# Patient Record
Sex: FEMALE | Race: BLACK OR AFRICAN AMERICAN | Employment: UNEMPLOYED | URBAN - METROPOLITAN AREA
[De-identification: names, ages, dates, MRNs, and addresses within clinical notes are randomized per-mention and may not be internally consistent; named-entity substitution may affect disease eponyms.]

---

## 2017-02-16 PROBLEM — E43 SEVERE PROTEIN-CALORIE MALNUTRITION (HCC): Status: ACTIVE | Noted: 2017-02-16

## 2017-12-08 PROBLEM — E46 PROTEIN CALORIE MALNUTRITION (HCC): Status: ACTIVE | Noted: 2017-01-01

## 2018-01-01 ENCOUNTER — OFFICE VISIT (OUTPATIENT)
Dept: FAMILY MEDICINE CLINIC | Age: 58
End: 2018-01-01
Payer: COMMERCIAL

## 2018-01-01 ENCOUNTER — HOSPITAL ENCOUNTER (OUTPATIENT)
Age: 58
Discharge: HOME | End: 2018-03-22
Payer: COMMERCIAL

## 2018-01-01 ENCOUNTER — APPOINTMENT (OUTPATIENT)
Dept: GENERAL RADIOLOGY | Age: 58
DRG: 196 | End: 2018-01-01
Payer: COMMERCIAL

## 2018-01-01 ENCOUNTER — TELEPHONE (OUTPATIENT)
Dept: FAMILY MEDICINE CLINIC | Age: 58
End: 2018-01-01

## 2018-01-01 ENCOUNTER — HOSPITAL ENCOUNTER (INPATIENT)
Age: 58
LOS: 1 days | DRG: 196 | End: 2018-07-11
Attending: EMERGENCY MEDICINE | Admitting: FAMILY MEDICINE
Payer: COMMERCIAL

## 2018-01-01 ENCOUNTER — OFFICE VISIT (OUTPATIENT)
Dept: NEUROLOGY | Age: 58
End: 2018-01-01
Payer: COMMERCIAL

## 2018-01-01 VITALS
HEIGHT: 67 IN | SYSTOLIC BLOOD PRESSURE: 99 MMHG | OXYGEN SATURATION: 95 % | RESPIRATION RATE: 16 BRPM | TEMPERATURE: 97.3 F | BODY MASS INDEX: 21.82 KG/M2 | DIASTOLIC BLOOD PRESSURE: 58 MMHG | WEIGHT: 139 LBS | HEART RATE: 67 BPM

## 2018-01-01 VITALS
HEIGHT: 67 IN | OXYGEN SATURATION: 47 % | TEMPERATURE: 94.8 F | WEIGHT: 131 LBS | SYSTOLIC BLOOD PRESSURE: 54 MMHG | BODY MASS INDEX: 20.56 KG/M2

## 2018-01-01 VITALS
TEMPERATURE: 97.6 F | HEART RATE: 70 BPM | RESPIRATION RATE: 18 BRPM | WEIGHT: 136 LBS | SYSTOLIC BLOOD PRESSURE: 98 MMHG | OXYGEN SATURATION: 97 % | BODY MASS INDEX: 21.35 KG/M2 | DIASTOLIC BLOOD PRESSURE: 66 MMHG | HEIGHT: 67 IN

## 2018-01-01 DIAGNOSIS — I46.9 CARDIAC ARREST (HCC): Primary | ICD-10-CM

## 2018-01-01 DIAGNOSIS — G20 PARKINSON'S DISEASE (HCC): Primary | ICD-10-CM

## 2018-01-01 DIAGNOSIS — Z91.81: ICD-10-CM

## 2018-01-01 DIAGNOSIS — E03.9 HYPOTHYROIDISM, UNSPECIFIED TYPE: ICD-10-CM

## 2018-01-01 DIAGNOSIS — Z76.0 MEDICATION REFILL: ICD-10-CM

## 2018-01-01 DIAGNOSIS — E03.9 HYPOTHYROIDISM, UNSPECIFIED TYPE: Primary | ICD-10-CM

## 2018-01-01 DIAGNOSIS — G20 PARKINSON'S DISEASE (HCC): ICD-10-CM

## 2018-01-01 LAB
AADO2: 501.6 MMHG
AADO2: 526.2 MMHG
AADO2: 581.6 MMHG
ALBUMIN SERPL-MCNC: 3.1 G/DL (ref 3.5–5.2)
ALBUMIN SERPL-MCNC: 3.1 G/DL (ref 3.5–5.2)
ALP BLD-CCNC: 47 U/L (ref 35–104)
ALP BLD-CCNC: 76 U/L (ref 35–104)
ALT SERPL-CCNC: 131 U/L (ref 0–32)
ALT SERPL-CCNC: 42 U/L (ref 0–32)
ANION GAP SERPL CALCULATED.3IONS-SCNC: 31 MMOL/L (ref 7–16)
ANION GAP SERPL CALCULATED.3IONS-SCNC: 32 MMOL/L (ref 7–16)
ANISOCYTOSIS: ABNORMAL
APTT: 80.4 SEC (ref 24.5–35.1)
AST SERPL-CCNC: 260 U/L (ref 0–31)
AST SERPL-CCNC: 624 U/L (ref 0–31)
B.E.: -20.7 MMOL/L (ref -3–3)
B.E.: -23.5 MMOL/L (ref -3–3)
BASOPHILS ABSOLUTE: 0.04 E9/L (ref 0–0.2)
BASOPHILS ABSOLUTE: 0.05 E9/L (ref 0–0.2)
BASOPHILS RELATIVE PERCENT: 0.3 % (ref 0–2)
BASOPHILS RELATIVE PERCENT: 0.9 % (ref 0–2)
BILIRUB SERPL-MCNC: 0.3 MG/DL (ref 0–1.2)
BILIRUB SERPL-MCNC: 0.5 MG/DL (ref 0–1.2)
BUN BLDV-MCNC: 16 MG/DL (ref 6–20)
BUN BLDV-MCNC: 20 MG/DL (ref 6–20)
BURR CELLS: ABNORMAL
CALCIUM SERPL-MCNC: 7.4 MG/DL (ref 8.6–10.2)
CALCIUM SERPL-MCNC: 9.9 MG/DL (ref 8.6–10.2)
CHLORIDE BLD-SCNC: 101 MMOL/L (ref 98–107)
CHLORIDE BLD-SCNC: 102 MMOL/L (ref 98–107)
CO2: 13 MMOL/L (ref 22–29)
CO2: 13 MMOL/L (ref 22–29)
COHB: 0.2 % (ref 0–1.5)
COHB: 0.3 % (ref 0–1.5)
COHB: 0.3 % (ref 0–1.5)
CREAT SERPL-MCNC: 0.9 MG/DL (ref 0.5–1)
CREAT SERPL-MCNC: 1.1 MG/DL (ref 0.5–1)
CRITICAL: ABNORMAL
DATE ANALYZED: ABNORMAL
DATE OF COLLECTION: ABNORMAL
EOSINOPHILS ABSOLUTE: 0.03 E9/L (ref 0.05–0.5)
EOSINOPHILS ABSOLUTE: 0.08 E9/L (ref 0.05–0.5)
EOSINOPHILS RELATIVE PERCENT: 0.2 % (ref 0–6)
EOSINOPHILS RELATIVE PERCENT: 1.7 % (ref 0–6)
FILM ARRAY ADENOVIRUS: NORMAL
FILM ARRAY BORDETELLA PERTUSSIS: NORMAL
FILM ARRAY CHLAMYDOPHILIA PNEUMONIAE: NORMAL
FILM ARRAY CORONAVIRUS 229E: NORMAL
FILM ARRAY CORONAVIRUS HKU1: NORMAL
FILM ARRAY CORONAVIRUS NL63: NORMAL
FILM ARRAY CORONAVIRUS OC43: NORMAL
FILM ARRAY INFLUENZA A VIRUS 09H1: NORMAL
FILM ARRAY INFLUENZA A VIRUS H1: NORMAL
FILM ARRAY INFLUENZA A VIRUS H3: NORMAL
FILM ARRAY INFLUENZA A VIRUS: NORMAL
FILM ARRAY INFLUENZA B: NORMAL
FILM ARRAY METAPNEUMOVIRUS: NORMAL
FILM ARRAY MYCOPLASMA PNEUMONIAE: NORMAL
FILM ARRAY PARAINFLUENZA VIRUS 1: NORMAL
FILM ARRAY PARAINFLUENZA VIRUS 2: NORMAL
FILM ARRAY PARAINFLUENZA VIRUS 3: NORMAL
FILM ARRAY PARAINFLUENZA VIRUS 4: NORMAL
FILM ARRAY RESPIRATORY SYNCITIAL VIRUS: NORMAL
FILM ARRAY RHINOVIRUS/ENTEROVIRUS: NORMAL
FIO2: 100 %
GFR AFRICAN AMERICAN: >60
GFR AFRICAN AMERICAN: >60
GFR NON-AFRICAN AMERICAN: >60 ML/MIN/1.73
GFR NON-AFRICAN AMERICAN: >60 ML/MIN/1.73
GLUCOSE BLD-MCNC: 324 MG/DL (ref 74–109)
GLUCOSE BLD-MCNC: 367 MG/DL (ref 74–109)
HCO3: 8.1 MMOL/L (ref 22–26)
HCO3: 8.9 MMOL/L (ref 22–26)
HCT VFR BLD CALC: 35.6 % (ref 34–48)
HCT VFR BLD CALC: 36.3 % (ref 34–48)
HEMOGLOBIN: 10.4 G/DL (ref 11.5–15.5)
HEMOGLOBIN: 11.6 G/DL (ref 11.5–15.5)
HHB: 6 % (ref 0–5)
HHB: 8.7 % (ref 0–5)
HHB: 9.5 % (ref 0–5)
IMMATURE GRANULOCYTES #: 0.26 E9/L
IMMATURE GRANULOCYTES %: 1.3 % (ref 0–5)
INR BLD: 1.1
LAB: 9558
LAB: ABNORMAL
LAB: ABNORMAL
LACTIC ACID: 19.1 MMOL/L (ref 0.5–2.2)
LYMPHOCYTES ABSOLUTE: 1.7 E9/L (ref 1.5–4)
LYMPHOCYTES ABSOLUTE: 3.06 E9/L (ref 1.5–4)
LYMPHOCYTES RELATIVE PERCENT: 65.2 % (ref 20–42)
LYMPHOCYTES RELATIVE PERCENT: 8.7 % (ref 20–42)
Lab: 1316
Lab: 1354
Lab: 1715
MAGNESIUM: 2.5 MG/DL (ref 1.6–2.6)
MCH RBC QN AUTO: 32.1 PG (ref 26–35)
MCH RBC QN AUTO: 32.2 PG (ref 26–35)
MCHC RBC AUTO-ENTMCNC: 29.2 % (ref 32–34.5)
MCHC RBC AUTO-ENTMCNC: 32 % (ref 32–34.5)
MCV RBC AUTO: 100.8 FL (ref 80–99.9)
MCV RBC AUTO: 109.9 FL (ref 80–99.9)
METAMYELOCYTES RELATIVE PERCENT: 3.5 % (ref 0–1)
METHB: 0.4 % (ref 0–1.5)
METHB: 0.4 % (ref 0–1.5)
METHB: 0.5 % (ref 0–1.5)
MODE: AC
MONOCYTES ABSOLUTE: 0.05 E9/L (ref 0.1–0.95)
MONOCYTES ABSOLUTE: 0.36 E9/L (ref 0.1–0.95)
MONOCYTES RELATIVE PERCENT: 0.9 % (ref 2–12)
MONOCYTES RELATIVE PERCENT: 1.8 % (ref 2–12)
MYELOCYTE PERCENT: 2.6 % (ref 0–0)
NEUTROPHILS ABSOLUTE: 1.46 E9/L (ref 1.8–7.3)
NEUTROPHILS ABSOLUTE: 17.12 E9/L (ref 1.8–7.3)
NEUTROPHILS RELATIVE PERCENT: 25.2 % (ref 43–80)
NEUTROPHILS RELATIVE PERCENT: 87.7 % (ref 43–80)
NUCLEATED RED BLOOD CELLS: 1.7 /100 WBC
O2 CONTENT: 14.1 ML/DL
O2 CONTENT: 15.5 ML/DL
O2 CONTENT: 15.7 ML/DL
O2 SATURATION: 90.4 % (ref 92–98.5)
O2 SATURATION: 91.2 % (ref 92–98.5)
O2 SATURATION: 94 % (ref 92–98.5)
O2HB: 89.8 % (ref 94–97)
O2HB: 90.6 % (ref 94–97)
O2HB: 93.3 % (ref 94–97)
OPERATOR ID: 1868
OPERATOR ID: 1868
OPERATOR ID: ABNORMAL
OVALOCYTES: ABNORMAL
PATIENT TEMP: 37 C
PCO2: 28.6 MMHG (ref 35–45)
PCO2: 47.2 MMHG (ref 35–45)
PCO2: 71.9 MMHG (ref 35–45)
PDW BLD-RTO: 13.2 FL (ref 11.5–15)
PDW BLD-RTO: 13.4 FL (ref 11.5–15)
PEEP/CPAP: 10 CMH?O
PEEP/CPAP: 5 CMH?O
PEEP/CPAP: 5 CMH?O
PFO2: 0.78 MMHG/%
PFO2: 1.14 MMHG/%
PFO2: 1.15 MMHG/%
PH BLOOD GAS: 6.89 (ref 7.35–7.45)
PH BLOOD GAS: 7.07 (ref 7.35–7.45)
PH BLOOD GAS: <6.725 (ref 7.35–7.45)
PLATELET # BLD: 110 E9/L (ref 130–450)
PLATELET # BLD: 173 E9/L (ref 130–450)
PMV BLD AUTO: 10.6 FL (ref 7–12)
PMV BLD AUTO: 11.4 FL (ref 7–12)
PO2: 114.5 MMHG (ref 60–100)
PO2: 114.6 MMHG (ref 60–100)
PO2: 77.8 MMHG (ref 60–100)
POIKILOCYTES: ABNORMAL
POTASSIUM REFLEX MAGNESIUM: 3.4 MMOL/L (ref 3.5–5)
POTASSIUM SERPL-SCNC: 3.16 MMOL/L (ref 3.3–5.1)
POTASSIUM SERPL-SCNC: 3.5 MMOL/L (ref 3.5–5)
PRO-BNP: 372 PG/ML (ref 0–125)
PROCALCITONIN: 1.01 NG/ML (ref 0–0.08)
PROTHROMBIN TIME: 12.6 SEC (ref 9.3–12.4)
RBC # BLD: 3.24 E12/L (ref 3.5–5.5)
RBC # BLD: 3.6 E12/L (ref 3.5–5.5)
RI(T): 438 %
RI(T): 459 %
RI(T): 7.48
RR MECHANICAL: 18 B/MIN
RR MECHANICAL: 22 B/MIN
RR MECHANICAL: 22 B/MIN
SODIUM BLD-SCNC: 146 MMOL/L (ref 132–146)
SODIUM BLD-SCNC: 146 MMOL/L (ref 132–146)
SOURCE, BLOOD GAS: ABNORMAL
THB: 10.6 G/DL (ref 11.5–16.5)
THB: 12 G/DL (ref 11.5–16.5)
THB: 12.4 G/DL (ref 11.5–16.5)
TIME ANALYZED: 1321
TIME ANALYZED: 1358
TIME ANALYZED: 1717
TOTAL PROTEIN: 5.3 G/DL (ref 6.4–8.3)
TOTAL PROTEIN: 5.3 G/DL (ref 6.4–8.3)
TROPONIN: 0.15 NG/ML (ref 0–0.03)
TSH SERPL DL<=0.05 MIU/L-ACNC: 0.12 UIU/ML (ref 0.27–4.2)
TSH SERPL DL<=0.05 MIU/L-ACNC: 10.67 UIU/ML (ref 0.27–4.2)
VT MECHANICAL: 460 ML
WBC # BLD: 19.5 E9/L (ref 4.5–11.5)
WBC # BLD: 4.7 E9/L (ref 4.5–11.5)

## 2018-01-01 PROCEDURE — 2500000003 HC RX 250 WO HCPCS: Performed by: EMERGENCY MEDICINE

## 2018-01-01 PROCEDURE — 2000000000 HC ICU R&B

## 2018-01-01 PROCEDURE — 2580000003 HC RX 258: Performed by: EMERGENCY MEDICINE

## 2018-01-01 PROCEDURE — G8420 CALC BMI NORM PARAMETERS: HCPCS | Performed by: FAMILY MEDICINE

## 2018-01-01 PROCEDURE — 84484 ASSAY OF TROPONIN QUANT: CPT

## 2018-01-01 PROCEDURE — 87486 CHLMYD PNEUM DNA AMP PROBE: CPT

## 2018-01-01 PROCEDURE — 6360000002 HC RX W HCPCS: Performed by: EMERGENCY MEDICINE

## 2018-01-01 PROCEDURE — 84145 PROCALCITONIN (PCT): CPT

## 2018-01-01 PROCEDURE — 87503 INFLUENZA DNA AMP PROB ADDL: CPT

## 2018-01-01 PROCEDURE — 3014F SCREEN MAMMO DOC REV: CPT | Performed by: FAMILY MEDICINE

## 2018-01-01 PROCEDURE — 3017F COLORECTAL CA SCREEN DOC REV: CPT | Performed by: FAMILY MEDICINE

## 2018-01-01 PROCEDURE — 99213 OFFICE O/P EST LOW 20 MIN: CPT | Performed by: FAMILY MEDICINE

## 2018-01-01 PROCEDURE — G8482 FLU IMMUNIZE ORDER/ADMIN: HCPCS | Performed by: FAMILY MEDICINE

## 2018-01-01 PROCEDURE — 85730 THROMBOPLASTIN TIME PARTIAL: CPT

## 2018-01-01 PROCEDURE — 51702 INSERT TEMP BLADDER CATH: CPT

## 2018-01-01 PROCEDURE — 71045 X-RAY EXAM CHEST 1 VIEW: CPT

## 2018-01-01 PROCEDURE — 93005 ELECTROCARDIOGRAM TRACING: CPT | Performed by: EMERGENCY MEDICINE

## 2018-01-01 PROCEDURE — 2500000003 HC RX 250 WO HCPCS

## 2018-01-01 PROCEDURE — 6360000002 HC RX W HCPCS

## 2018-01-01 PROCEDURE — 87798 DETECT AGENT NOS DNA AMP: CPT

## 2018-01-01 PROCEDURE — 99213 OFFICE O/P EST LOW 20 MIN: CPT | Performed by: NURSE PRACTITIONER

## 2018-01-01 PROCEDURE — 2580000003 HC RX 258: Performed by: INTERNAL MEDICINE

## 2018-01-01 PROCEDURE — 84443 ASSAY THYROID STIM HORMONE: CPT

## 2018-01-01 PROCEDURE — 36415 COLL VENOUS BLD VENIPUNCTURE: CPT

## 2018-01-01 PROCEDURE — 36620 INSERTION CATHETER ARTERY: CPT

## 2018-01-01 PROCEDURE — 3017F COLORECTAL CA SCREEN DOC REV: CPT | Performed by: NURSE PRACTITIONER

## 2018-01-01 PROCEDURE — 99291 CRITICAL CARE FIRST HOUR: CPT | Performed by: INTERNAL MEDICINE

## 2018-01-01 PROCEDURE — G8427 DOCREV CUR MEDS BY ELIG CLIN: HCPCS | Performed by: FAMILY MEDICINE

## 2018-01-01 PROCEDURE — 83605 ASSAY OF LACTIC ACID: CPT

## 2018-01-01 PROCEDURE — 85025 COMPLETE CBC W/AUTO DIFF WBC: CPT

## 2018-01-01 PROCEDURE — 83735 ASSAY OF MAGNESIUM: CPT

## 2018-01-01 PROCEDURE — 82805 BLOOD GASES W/O2 SATURATION: CPT

## 2018-01-01 PROCEDURE — 1036F TOBACCO NON-USER: CPT | Performed by: FAMILY MEDICINE

## 2018-01-01 PROCEDURE — 87581 M.PNEUMON DNA AMP PROBE: CPT

## 2018-01-01 PROCEDURE — 83880 ASSAY OF NATRIURETIC PEPTIDE: CPT

## 2018-01-01 PROCEDURE — 84132 ASSAY OF SERUM POTASSIUM: CPT

## 2018-01-01 PROCEDURE — 87040 BLOOD CULTURE FOR BACTERIA: CPT

## 2018-01-01 PROCEDURE — 36620 INSERTION CATHETER ARTERY: CPT | Performed by: INTERNAL MEDICINE

## 2018-01-01 PROCEDURE — G8427 DOCREV CUR MEDS BY ELIG CLIN: HCPCS | Performed by: NURSE PRACTITIONER

## 2018-01-01 PROCEDURE — 2500000003 HC RX 250 WO HCPCS: Performed by: INTERNAL MEDICINE

## 2018-01-01 PROCEDURE — 6360000002 HC RX W HCPCS: Performed by: INTERNAL MEDICINE

## 2018-01-01 PROCEDURE — 02HV33Z INSERTION OF INFUSION DEVICE INTO SUPERIOR VENA CAVA, PERCUTANEOUS APPROACH: ICD-10-PCS | Performed by: INTERNAL MEDICINE

## 2018-01-01 PROCEDURE — 36556 INSERT NON-TUNNEL CV CATH: CPT

## 2018-01-01 PROCEDURE — 03HY32Z INSERTION OF MONITORING DEVICE INTO UPPER ARTERY, PERCUTANEOUS APPROACH: ICD-10-PCS | Performed by: INTERNAL MEDICINE

## 2018-01-01 PROCEDURE — 80053 COMPREHEN METABOLIC PANEL: CPT

## 2018-01-01 PROCEDURE — 92950 HEART/LUNG RESUSCITATION CPR: CPT

## 2018-01-01 PROCEDURE — G8420 CALC BMI NORM PARAMETERS: HCPCS | Performed by: NURSE PRACTITIONER

## 2018-01-01 PROCEDURE — 87502 INFLUENZA DNA AMP PROBE: CPT

## 2018-01-01 PROCEDURE — 99255 IP/OBS CONSLTJ NEW/EST HI 80: CPT | Performed by: INTERNAL MEDICINE

## 2018-01-01 PROCEDURE — 99285 EMERGENCY DEPT VISIT HI MDM: CPT

## 2018-01-01 PROCEDURE — 96374 THER/PROPH/DIAG INJ IV PUSH: CPT

## 2018-01-01 PROCEDURE — 85610 PROTHROMBIN TIME: CPT

## 2018-01-01 PROCEDURE — 1036F TOBACCO NON-USER: CPT | Performed by: NURSE PRACTITIONER

## 2018-01-01 PROCEDURE — 96375 TX/PRO/DX INJ NEW DRUG ADDON: CPT

## 2018-01-01 RX ORDER — LANOLIN ALCOHOL/MO/W.PET/CERES
1000 CREAM (GRAM) TOPICAL DAILY
Qty: 30 TABLET | Refills: 0 | Status: SHIPPED | OUTPATIENT
Start: 2018-01-01

## 2018-01-01 RX ORDER — SODIUM CHLORIDE 0.9 % (FLUSH) 0.9 %
10 SYRINGE (ML) INJECTION PRN
Status: DISCONTINUED | OUTPATIENT
Start: 2018-01-01 | End: 2018-01-01 | Stop reason: HOSPADM

## 2018-01-01 RX ORDER — 0.9 % SODIUM CHLORIDE 0.9 %
1000 INTRAVENOUS SOLUTION INTRAVENOUS ONCE
Status: COMPLETED | OUTPATIENT
Start: 2018-01-01 | End: 2018-01-01

## 2018-01-01 RX ORDER — SODIUM CHLORIDE 0.9 % (FLUSH) 0.9 %
10 SYRINGE (ML) INJECTION EVERY 12 HOURS SCHEDULED
Status: DISCONTINUED | OUTPATIENT
Start: 2018-01-01 | End: 2018-01-01 | Stop reason: HOSPADM

## 2018-01-01 RX ORDER — NOREPINEPHRINE BITARTRATE 1 MG/ML
INJECTION, SOLUTION INTRAVENOUS
Status: COMPLETED
Start: 2018-01-01 | End: 2018-01-01

## 2018-01-01 RX ORDER — CALCIUM CHLORIDE 100 MG/ML
INJECTION INTRAVENOUS; INTRAVENTRICULAR DAILY PRN
Status: COMPLETED | OUTPATIENT
Start: 2018-01-01 | End: 2018-01-01

## 2018-01-01 RX ORDER — WHEELCHAIR
EACH MISCELLANEOUS
Qty: 1 EACH | Refills: 0 | Status: SHIPPED | OUTPATIENT
Start: 2018-01-01

## 2018-01-01 RX ORDER — LEVOTHYROXINE SODIUM 0.12 MG/1
125 TABLET ORAL DAILY
Qty: 30 TABLET | Refills: 1 | Status: SHIPPED | OUTPATIENT
Start: 2018-01-01

## 2018-01-01 RX ORDER — PIPERACILLIN SODIUM, TAZOBACTAM SODIUM 3; .375 G/15ML; G/15ML
3.38 INJECTION, POWDER, LYOPHILIZED, FOR SOLUTION INTRAVENOUS EVERY 8 HOURS
Status: DISCONTINUED | OUTPATIENT
Start: 2018-01-01 | End: 2018-01-01 | Stop reason: CLARIF

## 2018-01-01 RX ADMIN — CALCIUM CHLORIDE 1 G: 100 INJECTION, SOLUTION INTRAVENOUS at 13:03

## 2018-01-01 RX ADMIN — ENOXAPARIN SODIUM 40 MG: 40 INJECTION SUBCUTANEOUS at 18:49

## 2018-01-01 RX ADMIN — Medication 50 MEQ: at 13:02

## 2018-01-01 RX ADMIN — Medication 10 MCG/MIN: at 17:12

## 2018-01-01 RX ADMIN — Medication 150 MEQ: at 17:30

## 2018-01-01 RX ADMIN — SODIUM BICARBONATE: 84 INJECTION, SOLUTION INTRAVENOUS at 21:49

## 2018-01-01 RX ADMIN — SODIUM CHLORIDE 1000 ML: 9 INJECTION, SOLUTION INTRAVENOUS at 14:37

## 2018-01-01 RX ADMIN — EPINEPHRINE 10 MCG/MIN: 1 INJECTION INTRAMUSCULAR; INTRAVENOUS; SUBCUTANEOUS at 13:33

## 2018-01-01 RX ADMIN — EPINEPHRINE 35 MCG/MIN: 1 INJECTION INTRAMUSCULAR; INTRAVENOUS; SUBCUTANEOUS at 19:05

## 2018-01-01 RX ADMIN — SODIUM CHLORIDE 1000 ML: 9 INJECTION, SOLUTION INTRAVENOUS at 13:33

## 2018-01-01 RX ADMIN — EPINEPHRINE 35 MCG/MIN: 1 INJECTION INTRAMUSCULAR; INTRAVENOUS; SUBCUTANEOUS at 21:49

## 2018-01-01 RX ADMIN — EPINEPHRINE 35 MCG/MIN: 1 INJECTION INTRAMUSCULAR; INTRAVENOUS; SUBCUTANEOUS at 00:35

## 2018-01-01 RX ADMIN — EPINEPHRINE 0.5 MG: 0.1 INJECTION, SOLUTION ENDOTRACHEAL; INTRACARDIAC; INTRAVENOUS at 13:29

## 2018-01-01 RX ADMIN — SODIUM BICARBONATE 150 MEQ: 84 INJECTION, SOLUTION INTRAVENOUS at 17:30

## 2018-01-01 RX ADMIN — VASOPRESSIN 0.04 UNITS/MIN: 20 INJECTION INTRAVENOUS at 17:51

## 2018-01-01 RX ADMIN — Medication 50 MEQ: at 13:29

## 2018-01-01 RX ADMIN — EPINEPHRINE 1 MG: 0.1 INJECTION, SOLUTION ENDOTRACHEAL; INTRACARDIAC; INTRAVENOUS at 13:02

## 2018-01-01 RX ADMIN — SODIUM BICARBONATE: 84 INJECTION, SOLUTION INTRAVENOUS at 14:41

## 2018-01-01 ASSESSMENT — PULMONARY FUNCTION TESTS
PIF_VALUE: 29
PIF_VALUE: 32
PIF_VALUE: 29

## 2018-03-20 NOTE — PROGRESS NOTES
S:  55 yo female here for a follow up. Frequently sleepy. Recently neurologist changed her Parkinson's medications. Lost 5 pounds in the past month. Blood pressure 98/66, pulse 70, temperature 97.6 °F (36.4 °C), temperature source Axillary, resp. rate 18, height 5' 7\" (1.702 m), weight 136 lb (61.7 kg), SpO2 97 %, not currently breastfeeding. Gen: NAD, slouching, somnolent  HEENT: NCAT, PERRL, MMdry  Neck: supple  CV-RRR systolic murmur  Lungs-CTA b/l no R/R/W  ABD-soft nonttp no masses   Ext-no C/C/E  In a wheelchair    A/P: Parkinson's disease-continue meds prescribed by neurology. Hypersomnolence-repeat sleep study consideration, consider provigil. Referral to sleep medicine physician. Hypothyroidism-check tsh. Weight loss-watch diet. rto 4-6 weeks for focused visit for motorized     Attending Physician Statement  I have discussed the case, including pertinent history and exam findings with the resident. I agree with the documented assessment and plan.

## 2018-07-10 PROBLEM — I46.9 CARDIAC ARREST (HCC): Status: ACTIVE | Noted: 2018-01-01

## 2018-07-10 NOTE — ED NOTES
Dr. Meka James, family medicine resident, at bedside speaking with family at this time.      Matt Desai, RN  07/10/18 558 [facility] [place], [place]  07/10/18 2570

## 2018-07-10 NOTE — ED NOTES
Family at bedside, family updated by Dr. Tiana Olivares and Dr. Manjit Asher.      Matt Desai RN  07/10/18 [id number]

## 2018-07-10 NOTE — H&P
1620   Gross per 24 hour   Intake             1000 ml   Output                0 ml   Net             1000 ml     Wt Readings from Last 2 Encounters:   07/10/18 131 lb (59.4 kg)     Body mass index is 20.52 kg/m².       PHYSICAL EXAMINATION:  General appearance - intubated, unresponsive to verbal or pain stimuli  Mental status - unresponsive to verbal or pain stimuli  Eyes - equal round but non-reactive bilaterally  Ears -not examined  Nose - not examined  Mouth - Intubated  Neck - carotids upstroke normal bilaterally, no bruits, thyroid exam: thyroid is normal in size without nodules or tenderness  Chest - clear to auscultation, no wheezes, rales or rhonchi, symmetric air entry  Heart - normal rate, regular rhythm, normal S1, S2, no murmurs, rubs, clicks or gallops  Abdomen - soft, nontender, nondistended, no masses or organomegaly  Neurological - unresponsive to verbal or pain stimuli, no cough, no gag reflex  Extremities - peripheral pulses normal, no pedal edema, no clubbing or cyanosis  Skin - normal coloration and turgor, no rashes, no suspicious skin lesions noted      Any additional physical findings:    MEDICATIONS:  Scheduled Meds:   norepinephrine         Continuous Infusions:   EPINEPHrine infusion 35 mcg/min (07/10/18 1703)    IV infusion builder 150 mL/hr at 07/10/18 1441    norepinephrine 30 mcg/min (07/10/18 1729)    vasopressin infusion       PRN Meds:        VENT SETTINGS (Comprehensive) (if applicable):  Vent Information  Ventilator Started: Yes  Vent Type: 980  Vent Mode: AC/VC  Vt Ordered: 460 mL  Rate Set: 22 bmp  Peak Flow: 65 L/min  Pressure Support: 0 cmH20  FiO2 : 100 %  Sensitivity: 3  PEEP/CPAP: 10  I Time/ I Time %: 0 s  Humidification Source: Heated wire  Humidification Temp Measured: 37.2  Additional Respiratory  Assessments  Pulse: 101  Resp: 9  SpO2: 90 %  End Tidal CO2: 16 (%)  Humidification Source: Heated wire  Subglottic Suction Done?: No    ABGs:   Recent Labs      07/10/18 1715   PH  7.071*   PCO2  28.6*   PO2  77.8   HCO3  8.1*   BE  -20.7*   O2SAT  90.4*       Laboratory findings:  Complete Blood Count: Recent Labs      07/10/18   1330   WBC  4.7   HGB  10.4*   HCT  35.6   PLT  110*        Last 3 Blood Glucose:   Recent Labs      07/10/18   1330   GLUCOSE  324*        PT/INR:    Lab Results   Component Value Date    PROTIME 12.6 07/10/2018    INR 1.1 07/10/2018     PTT:    Lab Results   Component Value Date    APTT 80.4 07/10/2018       Comprehensive Metabolic Profile:   Recent Labs      07/10/18   1316  07/10/18   1330   NA   --   146   K  3.16*  3.5   CL   --   102   CO2   --   13*   BUN   --   16   CREATININE   --   0.9   GLUCOSE   --   324*   CALCIUM   --   9.9   PROT   --   5.3*   LABALBU   --   3.1*   BILITOT   --   0.3   ALKPHOS   --   47   AST   --   260*   ALT   --   42*      Magnesium: No results found for: MG  Phosphorus: No results found for: PHOS  Ionized Calcium: No results found for: CAION   Troponin:   Recent Labs      07/10/18   1330   TROPONINI  0.15*     Urinalysis: Urine dipstick shows not done. Micro exam: not done.      Microbiology:  Cultures during this admission:     Blood cultures:                 [] None drawn      [] Negative             []  Positive (Details:  )  Urine Culture:                   [] None drawn      [] Negative             []  Positive (Details:  )  Sputum Culture:               [] None drawn       [] Negative             []  Positive (Details:  )   Endotracheal aspirate:     [] None drawn       [] Negative             []  Positive (Details:  )     Other pertinent Labs:     Radiology/Imaging:   Chest Xray (7/10/2018):    ASSESSMENT  And PLAN:     ROSC s/p PEA Cardiac arrest  -Unknown etiology, no cardiac hx  -Arrhythmia from uncontrolled hypothyroidism could be a cause  -TSH 10.67  -1 hr CPR  -Troponin 0.15 on presentation  -Trend cardiac enzyme  -not a candidate for cooling due to being hemodynamically unstable and unknown amount of down

## 2018-07-10 NOTE — CODE DOCUMENTATION
Per EMS, patient found in asystole, shocked v-fib x1, then went asystole. Per EMS pt now PEA. 1213 time of call - no bystander CPR, working patient for 45 minutes.

## 2018-07-10 NOTE — PROGRESS NOTES
information that was obtained, reviewed, analyzed and interpreted today. Imaging test are reviewed with the radiologist during rounds. Comparison to previous images are always explored. CBC:   Lab Results   Component Value Date    WBC 4.7 07/10/2018    RBC 3.24 07/10/2018    HGB 10.4 07/10/2018    HCT 35.6 07/10/2018     07/10/2018    .9 07/10/2018       BMP:    Lab Results   Component Value Date     07/10/2018    K 3.5 07/10/2018     07/10/2018    CO2 13 07/10/2018    BUN 16 07/10/2018    CREATININE 0.9 07/10/2018    GLUCOSE 324 07/10/2018    CALCIUM 9.9 07/10/2018       TSH:  No results found for: TSH      Imaging Studies:  RADIOLOGY: Films were read/reviewed and or discussed with radiology and show cardiomegly    EKG: ICU Rhythm Strips were reviewed and discussed. Sinus     Assessment    Rena Mcburney is a 56 y.o. female was seen, examined and discussed with the multi-disciplinary ICU team during rounds. This progress note may be separate or in addition to the residents record. I have personally seen and examined the patient and the key elements of the encounter were performed by me. The medications & laboratory data was discussed and adjusted where necessary. The radiographic images were reviewed either as a group or with radiologist if felt dis-concordant with the exam or history. The above findings were corroborated, plans confirmed and changes made if needed. Current issues are :  1. S/p cardiopulmonary arrest with prolonged time for restoration of spontaneous circulation   2. Profound acidosis secondary to #1   3. High likelihood of irreversible anoxic encephalopathy secondary to #1  4. Advanced Parkinson's Disease  5. Anemia  6. Hypothyroidism            Plan   Family is updated at the bedside as available. Key issues of the case were discussed among consultants. Critical Care time is documented if appropriate. 1. Supportive measures  2.  Emperic antibiotics for aspiration  3. Lines for HD monitoring  4. Neurologic serial exam and neurological evaluation  5.  HCO3 gtt with ABG re-evaluation    [facility] of Medicine

## 2018-07-11 NOTE — ED PROVIDER NOTES
Department of Emergency Medicine   ED  Provider Note  Admit Date/RoomTime: 7/10/2018 12:58 PM  ED Room: 4431/4431-A                7/10/18  8:46 PM      HISTORY OF PRESENT ILLNESS:  (Nurses Notes Reviewed)    Chief Complaint:   Cardiac Arrest (found down, asystole -> V-Fib -> shocked x1 -> PEA)      Source of history provided by:  EMS personnel. History/Exam Limitations: acuity of illness. Raysa Fish is a 56 y.o. old female presenting to the emergency department by ambulance where the patient received oxygen and IV prior to arrival., for cardiac arrest, which occured unknown time prior to arrival.   She has a history of hypothyroidism. Code Status on file: limited code - perform ll life saving interventions. Should she be on chronic mechanical ventilation, she would like to be taken off. Duration:  Down time from arrest until ambulance arrival unknown. Total Time from arrest until hospital arrival unknown. Onset:  sudden. Witnessed: no.    Available History:      Prior Cardiac Disease:   No.     Drug Use/Overdose ? Unknown. Drowning ? No.     GI Bleeding ? No.     Hypothermia ? No.     Other:   N/A. Prehospital Care:  See below. Initial Rhythm: asystole. Prehospital Treatment:       CPR:   yes. Intubation:   yes     IV Established:   yes     Defibrillation:   yes     External Pacer:   no     Epinephrine:   yes     Atropine:   no     Response to Treatment:  Transient return of pulse: No.                                               Sustained return of pulse:  No.  Associated Signs and Symptoms (preceding arrest):  unknown. Other History:   not currently available. PAST MEDICAL, SURGICAL, SOCIAL AND FAMILY HISTORY SECTION    Past Medical History:  has no past medical history on file. Past Surgical History:  has no past surgical history on file. Social History:      Family History: family history is not on file.      The patients home medications have been reviewed. Allergies: Percocet [oxycodone-acetaminophen] and Pcn [penicillins]    Review of Systems:   Pertinent positives and negatives are stated within HPI, all other systems reviewed and are negative. PHYSICAL EXAM:   General: Unresponsive GCS 15. Head: Atraumatic. Eyes: Fixed and dilated  ENT: Airway patent. ETT in place 22 at lip. Cardiovascular: Heart sounds are Absent. Pulses are Absent. Respiratory: No spontaneous respirations. Equal breath sounds with controlled ventilation. Abdominal: nondistended. Musculoskeletal: No deformities. Skin: Pallor. Neurological: Unresponsive.  Neurological exam limited due to clinical condition.       ------------------------------------------------ RESULTS ---------------------------------------------------    LABS  Results for orders placed or performed during the hospital encounter of 07/10/18   Troponin   Result Value Ref Range    Troponin 0.15 (H) 0.00 - 0.03 ng/mL   CBC Auto Differential   Result Value Ref Range    WBC 4.7 4.5 - 11.5 E9/L    RBC 3.24 (L) 3.50 - 5.50 E12/L    Hemoglobin 10.4 (L) 11.5 - 15.5 g/dL    Hematocrit 35.6 34.0 - 48.0 %    .9 (H) 80.0 - 99.9 fL    MCH 32.1 26.0 - 35.0 pg    MCHC 29.2 (L) 32.0 - 34.5 %    RDW 13.2 11.5 - 15.0 fL    Platelets 075 (L) 547 - 450 E9/L    MPV 11.4 7.0 - 12.0 fL    Neutrophils % 25.2 (L) 43.0 - 80.0 %    Lymphocytes % 65.2 (H) 20.0 - 42.0 %    Monocytes % 0.9 (L) 2.0 - 12.0 %    Eosinophils % 1.7 0.0 - 6.0 %    Basophils % 0.9 0.0 - 2.0 %    Neutrophils # 1.46 (L) 1.80 - 7.30 E9/L    Lymphocytes # 3.06 1.50 - 4.00 E9/L    Monocytes # 0.05 (L) 0.10 - 0.95 E9/L    Eosinophils # 0.08 0.05 - 0.50 E9/L    Basophils # 0.04 0.00 - 0.20 E9/L    Metamyelocytes Relative 3.5 (H) 0.0 - 1.0 %    Myelocytes Relative 2.6 0 - 0 %    nRBC 1.7 /100 WBC    Anisocytosis 1+     Poikilocytes 3+     [place] Cells 3+     Ovalocytes 1+    Comprehensive Metabolic Panel   Result Value Ref Range    Sodium 146 132 - 146 mmol/L    Potassium 3.5 3.5 - 5.0 mmol/L    Chloride 102 98 - 107 mmol/L    CO2 13 (L) 22 - 29 mmol/L    Anion Gap 31 (H) 7 - 16 mmol/L    Glucose 324 (H) 74 - 109 mg/dL    BUN 16 6 - 20 mg/dL    CREATININE 0.9 0.5 - 1.0 mg/dL    GFR Non-African American >60 >=60 mL/min/1.73    GFR African American >60     Calcium 9.9 8.6 - 10.2 mg/dL    Total Protein 5.3 (L) 6.4 - 8.3 g/dL    Alb 3.1 (L) 3.5 - 5.2 g/dL    Total Bilirubin 0.3 0.0 - 1.2 mg/dL    Alkaline Phosphatase 47 35 - 104 U/L    ALT 42 (H) 0 - 32 U/L     (H) 0 - 31 U/L   Protime-INR   Result Value Ref Range    Protime 12.6 (H) 9.3 - 12.4 sec    INR 1.1    APTT   Result Value Ref Range    aPTT 80.4 (H) 24.5 - 35.1 sec   Brain Natriuretic Peptide   Result Value Ref Range    Pro- (H) 0 - 125 pg/mL   Blood Gas, Arterial   Result Value Ref Range    Date Analyzed [id number]     Time Analyzed 8531     Source: Blood Arterial     pH, Blood Gas <6.725 (LL) 7.350 - 7.450    PCO2 71.9 (HH) 35.0 - 45.0 mmHg    PO2 114.5 (H) 60.0 - 100.0 mmHg    O2 Sat 91.2 (L) 92.0 - 98.5 %    PO2/FIO2 1.14 mmHg/%    AaDO2 501.6 mmHg    RI(T) 438 %    O2Hb 90.6 (L) 94.0 - 97.0 %    COHb 0.3 0.0 - 1.5 %    MetHb 0.4 0.0 - 1.5 %    O2 Content 15.5 mL/dL    HHb 8.7 (H) 0.0 - 5.0 %    tHb (est) 12.0 11.5 - 16.5 g/dL    Potassium 3.16 (L) 3.30 - 5.10 mmol/L    Mode AC     FIO2 100.0 %    Rr Mechanical 18.0 b/min    Vt Mechanical 460 mL    Peep/Cpap 5.0 cmH? O    Date Of Collection [id number]     Time Collected 1316     Pt Temp 37.0 C     ID 1868     Lab [id number]     Critical(s) Notified Dr present at Team/RRT    Lactic Acid, Plasma   Result Value Ref Range    Lactic Acid 19.1 (HH) 0.5 - 2.2 mmol/L   Blood Gas, Arterial   Result Value Ref Range    Date Analyzed [id number]     Time Analyzed 1352     Source: Blood Arterial     pH, Blood Gas 6.892 (LL) 7.350 - 7.450    PCO2 47.2 (H) 35.0 - 45.0 mmHg    PO2 114.6 (H) 60.0 - 100.0 mmHg    HCO3 8.9 (L) 22.0 - 26.0 mmol/L    B.E. -23.5 (L) -3.0 - 3.0 mmol/L    O2 Sat 94.0 92.0 - 98.5 %    PO2/FIO2 1.15 mmHg/%    AaDO2 526.2 mmHg    RI(T) 459 %    O2Hb 93.3 (L) 94.0 - 97.0 %    COHb 0.2 0.0 - 1.5 %    MetHb 0.5 0.0 - 1.5 %    O2 Content 14.1 mL/dL    HHb 6.0 (H) 0.0 - 5.0 %    tHb (est) 10.6 (L) 11.5 - 16.5 g/dL    Mode AC     FIO2 100.0 %    Rr Mechanical 22.0 b/min    Vt Mechanical 460 mL    Peep/Cpap 5.0 cmH? O    Date Of Collection [id number]     Time Collected 1354     Pt Temp 37.0 C     ID 1868     Lab [id number]     Critical(s) Notified Handed report to Dr/RN    Blood Gas, Arterial   Result Value Ref Range    Date Analyzed [id number]     Time Analyzed 1717     Source: Blood Arterial     pH, Blood Gas 7.071 (LL) 7.350 - 7.450    PCO2 28.6 (L) 35.0 - 45.0 mmHg    PO2 77.8 60.0 - 100.0 mmHg    HCO3 8.1 (L) 22.0 - 26.0 mmol/L    B.E. -20.7 (L) -3.0 - 3.0 mmol/L    O2 Sat 90.4 (L) 92.0 - 98.5 %    PO2/FIO2 0.78 mmHg/%    AaDO2 581.6 mmHg    RI(T) 7.48     O2Hb 89.8 (L) 94.0 - 97.0 %    COHb 0.3 0.0 - 1.5 %    MetHb 0.4 0.0 - 1.5 %    O2 Content 15.7 mL/dL    HHb 9.5 (H) 0.0 - 5.0 %    tHb (est) 12.4 11.5 - 16.5 g/dL    Mode AC     FIO2 100.0 %    Rr Mechanical 22.0 b/min    Vt Mechanical 460 mL    Peep/Cpap 10.0 cmH? O    Date Of Collection [id number]     Time Collected 1715     Pt Temp 37.0 C     ID [id number]     Lab [id number]     Critical(s) Notified Handed report to /RN    CBC auto differential   Result Value Ref Range    WBC 19.5 (H) 4.5 - 11.5 E9/L    RBC 3.60 3.50 - 5.50 E12/L    Hemoglobin 11.6 11.5 - 15.5 g/dL    Hematocrit 36.3 34.0 - 48.0 %    .8 (H) 80.0 - 99.9 fL    MCH 32.2 26.0 - 35.0 pg    MCHC 32.0 32.0 - 34.5 %    RDW 13.4 11.5 - 15.0 fL    Platelets 929 406 - 463 E9/L    MPV 10.6 7.0 - 12.0 fL    Neutrophils % 87.7 (H) 43.0 - 80.0 %    Immature Granulocytes % 1.3 0.0 - 5.0 %    Lymphocytes % 8.7 (L) 20.0 - 42.0 %    Monocytes % 1.8 (L) 2.0 - 12.0 %    Eosinophils % 0.2 0.0 - 6.0 %    Basophils % 0.3 0.0 - 2.0 %    Neutrophils # 17.12 (H) 1.80 - 7.30 E9/L    Immature Granulocytes # 0.26 E9/L    Lymphocytes # 1.70 1.50 - 4.00 E9/L    Monocytes # 0.36 0.10 - 0.95 E9/L    Eosinophils # 0.03 (L) 0.05 - 0.50 E9/L    Basophils # 0.05 0.00 - 0.20 E9/L   Comprehensive Metabolic Panel w/ Reflex to MG   Result Value Ref Range    Sodium 146 132 - 146 mmol/L    Potassium reflex Magnesium 3.4 (L) 3.5 - 5.0 mmol/L    Chloride 101 98 - 107 mmol/L    CO2 13 (L) 22 - 29 mmol/L    Anion Gap 32 (H) 7 - 16 mmol/L    Glucose 367 (H) 74 - 109 mg/dL    BUN 20 6 - 20 mg/dL    CREATININE 1.1 (H) 0.5 - 1.0 mg/dL    GFR Non-African American >60 >=60 mL/min/1.73    GFR African American >60     Calcium 7.4 (L) 8.6 - 10.2 mg/dL    Total Protein 5.3 (L) 6.4 - 8.3 g/dL    Alb 3.1 (L) 3.5 - 5.2 g/dL    Total Bilirubin 0.5 0.0 - 1.2 mg/dL    Alkaline Phosphatase 76 35 - 104 U/L     (H) 0 - 32 U/L     (H) 0 - 31 U/L   TSH without Reflex   Result Value Ref Range    TSH 10.670 (H) 0.270 - 4.200 uIU/mL   Procalcitonin   Result Value Ref Range    Procalcitonin 1.01 (H) 0.00 - 0.08 ng/mL   EKG 12 Lead   Result Value Ref Range    Ventricular Rate 80 BPM    Atrial Rate 80 BPM    P-R Interval 168 ms    QRS Duration 158 ms    Q-T Interval 492 ms    QTc Calculation (Bazett) 567 ms    P Axis 64 degrees    R Axis -77 degrees    T Axis 14 degrees   EKG 12 Lead   Result Value Ref Range    Ventricular Rate 87 BPM    Atrial Rate 87 BPM    P-R Interval 142 ms    QRS Duration 152 ms    Q-T Interval 498 ms    QTc Calculation (Bazett) 599 ms    P Axis 62 degrees    R Axis -68 degrees    T Axis 42 degrees       RADIOLOGY  XR CHEST PORTABLE   Final Result   ALERT:  THIS IS AN ABNORMAL REPORT   1. Diffuse opacification throughout the entire right lung could be   reflective of pulmonary contusion versus multifocal   infiltrate/pneumonia or other abnormalities, nonspecific on this exam.   2. Suspected underlying mild pulmonary edema.    3. Endotracheal tube and NG tube and right-sided central line as   described above. XR CHEST PORTABLE   Final Result   1. Satisfactory position of lines and catheters. 2. Diffuse ground glass opacities differential considerations   pulmonary edema/pulmonary vascular congestion, versus multifocal   pneumonia,            EKG Interpretation after ROSC:  Interpreted by emergency department physician, Dr. Jm Ordonez     Rhythm: normal sinus   ST Segments: elevation in  AVr, depression diffusely in other leads      Clinical Impression: subendocardial ischemia  Comparison to Old EKG  Changes compared to previous    ---------------------------- NURSING NOTES AND VITALS REVIEWED -------------------------   The nursing notes within the ED encounter and vital signs as below have been reviewed.    BP (!) 87/58   Pulse 103   Temp 93.7 °F (34.3 °C)   Resp 22   Ht 5' 7\" (1.702 m)   Wt 131 lb (59.4 kg)   SpO2 (!) 82%   BMI 20.52 kg/m²   Oxygen Saturation Interpretation: Improved after treatment      ------------------------------------------PROGRESS NOTES -------------------------------------------    ED COURSE MEDICATIONS:                Medications   EPINEPHrine (EPINEPHrine HCL) 5 mg in dextrose 5 % 250 mL infusion (35 mcg/min Intravenous New Bag 7/10/18 1905)   sodium bicarbonate 150 mEq in sterile water 1,000 mL infusion ( Intravenous New Bag 7/10/18 1441)   norepinephrine (LEVOPHED) 16 mg in dextrose 5% 250 mL infusion (30 mcg/min Intravenous Rate/Dose Change 7/10/18 8681)   vasopressin 20 Units in dextrose 5 % 100 mL infusion (0.04 Units/min Intravenous New Bag 7/10/18 8724)   sodium chloride flush 0.9 % injection 10 mL (not administered)   sodium chloride flush 0.9 % injection 10 mL (not administered)   magnesium hydroxide (MILK OF MAGNESIA) 400 MG/5ML suspension 30 mL (not administered)   enoxaparin (LOVENOX) injection 40 mg (40 mg Subcutaneous Given 7/10/18 8868)   vancomycin (VANCOCIN) 1,500 mg in sodium chloride 0.9 % 300 mL IVPB (not administered)

## 2018-07-13 LAB
EKG ATRIAL RATE: 80 BPM
EKG ATRIAL RATE: 87 BPM
EKG P AXIS: 62 DEGREES
EKG P AXIS: 64 DEGREES
EKG P-R INTERVAL: 142 MS
EKG P-R INTERVAL: 168 MS
EKG Q-T INTERVAL: 492 MS
EKG Q-T INTERVAL: 498 MS
EKG QRS DURATION: 152 MS
EKG QRS DURATION: 158 MS
EKG QTC CALCULATION (BAZETT): 567 MS
EKG QTC CALCULATION (BAZETT): 599 MS
EKG R AXIS: -68 DEGREES
EKG R AXIS: -77 DEGREES
EKG T AXIS: 14 DEGREES
EKG T AXIS: 42 DEGREES
EKG VENTRICULAR RATE: 80 BPM
EKG VENTRICULAR RATE: 87 BPM

## 2018-07-15 LAB
BLOOD CULTURE, ROUTINE: NORMAL
CULTURE, BLOOD 2: NORMAL